# Patient Record
Sex: FEMALE | Race: WHITE
[De-identification: names, ages, dates, MRNs, and addresses within clinical notes are randomized per-mention and may not be internally consistent; named-entity substitution may affect disease eponyms.]

---

## 2017-02-03 ENCOUNTER — HOSPITAL ENCOUNTER (OUTPATIENT)
Dept: HOSPITAL 80 - GIMAGING | Age: 72
End: 2017-02-03
Attending: FAMILY MEDICINE
Payer: COMMERCIAL

## 2017-02-03 DIAGNOSIS — R07.89: Primary | ICD-10-CM

## 2017-02-03 NOTE — DX
Chest, PA and Lateral



History: Chest wall pain



Comparison: June 26, 2008



Findings: Lungs are clear, without infiltrate or consolidation. Heart size is normal. There is no jesus
nopathy or mass lesion. There is no pleural effusion or pneumothorax. Bones are unremarkable for age.
 No rib fracture identified. Stable right upper quadrant surgical clips are consistent with prior cho
lecystectomy.



Impression: No source for pain identified.

## 2017-05-17 ENCOUNTER — HOSPITAL ENCOUNTER (OUTPATIENT)
Dept: HOSPITAL 80 - CIMAGING | Age: 72
End: 2017-05-17
Attending: GENERAL ACUTE CARE HOSPITAL
Payer: COMMERCIAL

## 2017-05-17 DIAGNOSIS — Z12.31: Primary | ICD-10-CM

## 2017-05-17 PROCEDURE — G0202 SCR MAMMO BI INCL CAD: HCPCS

## 2017-08-24 ENCOUNTER — HOSPITAL ENCOUNTER (OUTPATIENT)
Dept: HOSPITAL 80 - CIMAGING | Age: 72
End: 2017-08-24
Attending: PHYSICIAN ASSISTANT
Payer: COMMERCIAL

## 2017-08-24 DIAGNOSIS — M25.511: Primary | ICD-10-CM

## 2018-09-17 ENCOUNTER — HOSPITAL ENCOUNTER (OUTPATIENT)
Dept: HOSPITAL 80 - CIMAGING | Age: 73
End: 2018-09-17
Attending: OBSTETRICS & GYNECOLOGY
Payer: COMMERCIAL

## 2018-09-17 DIAGNOSIS — Z12.31: Primary | ICD-10-CM
